# Patient Record
Sex: FEMALE | Race: BLACK OR AFRICAN AMERICAN | NOT HISPANIC OR LATINO | Employment: FULL TIME | URBAN - METROPOLITAN AREA
[De-identification: names, ages, dates, MRNs, and addresses within clinical notes are randomized per-mention and may not be internally consistent; named-entity substitution may affect disease eponyms.]

---

## 2017-01-16 ENCOUNTER — HOSPITAL ENCOUNTER (EMERGENCY)
Facility: OTHER | Age: 52
Discharge: HOME | End: 2017-01-16
Attending: EMERGENCY MEDICINE
Payer: MEDICAID

## 2017-01-16 VITALS
HEIGHT: 61 IN | WEIGHT: 152.56 LBS | SYSTOLIC BLOOD PRESSURE: 151 MMHG | OXYGEN SATURATION: 98 % | TEMPERATURE: 98 F | BODY MASS INDEX: 28.8 KG/M2 | RESPIRATION RATE: 20 BRPM | HEART RATE: 76 BPM | DIASTOLIC BLOOD PRESSURE: 90 MMHG

## 2017-01-16 DIAGNOSIS — J04.0 LARYNGITIS: Primary | ICD-10-CM

## 2017-01-16 DIAGNOSIS — J06.9 UPPER RESPIRATORY TRACT INFECTION, UNSPECIFIED TYPE: ICD-10-CM

## 2017-01-16 DIAGNOSIS — R05.9 COUGH: ICD-10-CM

## 2017-01-16 PROCEDURE — 25000242 PHARM REV CODE 250 ALT 637 W/ HCPCS: Performed by: EMERGENCY MEDICINE

## 2017-01-16 PROCEDURE — 94640 AIRWAY INHALATION TREATMENT: CPT

## 2017-01-16 PROCEDURE — 99284 EMERGENCY DEPT VISIT MOD MDM: CPT

## 2017-01-16 PROCEDURE — 99900035 HC TECH TIME PER 15 MIN (STAT)

## 2017-01-16 RX ORDER — BENZONATATE 100 MG/1
100 CAPSULE ORAL 3 TIMES DAILY PRN
Qty: 20 CAPSULE | Refills: 0 | Status: SHIPPED | OUTPATIENT
Start: 2017-01-16 | End: 2017-01-26

## 2017-01-16 RX ORDER — ALBUTEROL SULFATE 90 UG/1
1-2 AEROSOL, METERED RESPIRATORY (INHALATION) EVERY 6 HOURS PRN
Qty: 1 INHALER | Refills: 0 | Status: SHIPPED | OUTPATIENT
Start: 2017-01-16 | End: 2017-11-27

## 2017-01-16 RX ORDER — IPRATROPIUM BROMIDE AND ALBUTEROL SULFATE 2.5; .5 MG/3ML; MG/3ML
3 SOLUTION RESPIRATORY (INHALATION)
Status: COMPLETED | OUTPATIENT
Start: 2017-01-16 | End: 2017-01-16

## 2017-01-16 RX ADMIN — IPRATROPIUM BROMIDE AND ALBUTEROL SULFATE 3 ML: .5; 3 SOLUTION RESPIRATORY (INHALATION) at 05:01

## 2017-01-16 NOTE — ED NOTES
Upon discharging another pt. Ms. Brambila found to be audibly wheezing from 4 feet, Dr. Adams and Dr. Oliver notified, pt placed in room 15

## 2017-01-16 NOTE — ED NOTES
Pt c/o SOB w/ productive cough of clear sputum x 3 days. Pt denies headache, sore throat, fever, chills and N/V/D. Pt is A & O x 3, no use of accessory muscles and no nasal flaring noted. Skin is warm and dry w/ pink mucosa.

## 2017-01-16 NOTE — ED AVS SNAPSHOT
[facility]  [address]  [facility] [address]               Zoe Kosta   2017  4:23 AM   ED    Description:  Female : 1965   Department:  [facility]           Your Care was Coordinated By:     Provider Role From To    Ck Oliver MD Attending Provider 17 0424 --      Reason for Visit     Shortness of Breath           Diagnoses this Visit        Comments    Laryngitis    -  Primary     Cough         Upper respiratory tract infection, unspecified type           ED Disposition     None           To Do List           Follow-up Information     Follow up with [facility]. Schedule an appointment as soon as possible for a visit in 3 days.    Contact information:    [address]  [place] [address]  [phone]          Follow up with [facility].    Specialty:  Emergency Medicine    Why:  If symptoms worsen    Contact information:    [address]  [facility] [address] [phone]       These Medications        Disp Refills Start End    benzonatate (TESSALON) 100 MG capsule 20 capsule 0 2017    Take 1 capsule (100 mg total) by mouth 3 (three) times daily as needed for Cough. - Oral    albuterol 90 mcg/actuation inhaler 1 Inhaler 0 2017    Inhale 1-2 puffs into the lungs every 6 (six) hours as needed for Wheezing. - Inhalation      [facility] On Call     [facility] -  Assistance  Registered nurses in the [facility] provide clinical advisement, health education, appointment booking, and other advisory services.  Call for this free service at [phone].             Medications           Message regarding Medications     Verify the changes and/or additions to your medication regime listed below are the same as discussed with your clinician today.  If any of these changes or additions are incorrect, please  "notify your healthcare provider.        START taking these NEW medications        Refills    benzonatate (TESSALON) 100 MG capsule 0    Sig: Take 1 capsule (100 mg total) by mouth 3 (three) times daily as needed for Cough.    Class: Print    Route: Oral    albuterol 90 mcg/actuation inhaler 0    Sig: Inhale 1-2 puffs into the lungs every 6 (six) hours as needed for Wheezing.    Class: Print    Route: Inhalation      These medications were administered today        Dose Freq    albuterol-ipratropium 2.5mg-0.5mg/3mL nebulizer solution 3 mL 3 mL ED 1 Time    Sig: Take 3 mLs by nebulization ED 1 Time.    Class: Normal    Route: Nebulization      STOP taking these medications     ibuprofen (ADVIL,MOTRIN) 600 MG tablet Take 1 tablet (600 mg total) by mouth every 6 (six) hours as needed for Pain.           Verify that the below list of medications is an accurate representation of the medications you are currently taking.  If none reported, the list may be blank. If incorrect, please contact your healthcare provider. Carry this list with you in case of emergency.           Current Medications     albuterol 90 mcg/actuation inhaler Inhale 1-2 puffs into the lungs every 6 (six) hours as needed for Wheezing.    benzonatate (TESSALON) 100 MG capsule Take 1 capsule (100 mg total) by mouth 3 (three) times daily as needed for Cough.           Clinical Reference Information           Your Vitals Were     BP Pulse Temp Resp Height Weight    151/90 (BP Location: Left arm, Patient Position: Sitting) 76 97.7 °F (36.5 °C) (Oral) 20 5' 1" (1.549 m) 69.2 kg (152 lb 8.9 oz)    SpO2 BMI             98% 28.83 kg/m2         Allergies as of 1/16/2017     No Known Allergies      Immunizations Administered on Date of Encounter - 1/16/2017     None      ED Micro, Lab, POCT     None      ED Imaging Orders     Start Ordered       Status Ordering Provider    01/16/17 0426 01/16/17 0425  X-Ray Chest PA And Lateral  1 time imaging      Final result  "      Discharge References/Attachments     LARYNGITIS (ENGLISH)    URI, VIRAL, NO ABX (ADULT) (ENGLISH)    BENZONATATE ORAL CAPSULE (ENGLISH)    ALBUTEROL SULFATE ORAL TABLET (ENGLISH)      [facility] Sign-Up     Activating your [facility] account is as easy as 1-2-3!     1) Visit [email or web address], select Sign Up Now, enter this activation code and your date of birth, then select Next.  [email or web address]  Expires: 3/2/2017  5:40 AM      2) Create a username and password to use when you visit [facility] in the future and select a security question in case you lose your password and select Next.    3) Enter your e-mail address and click Sign Up!    Additional Information  If you have questions, please e-mail [email or web address] or call [phone] to talk to our [facility] staff. Remember, [facility] is NOT to be used for urgent needs. For medical emergencies, dial 911.         Smoking Cessation     If you would like to quit smoking:   You may be eligible for free services if you are a [place] resident and started smoking cigarettes before September 1, 1988.  Call the Smoking Cessation [employer] ([facility]) toll free at (917) 776-0667 or (035) 534-0747.   Call 6-311-QUIT-NOW if you do not meet the above criteria.             [facility] complies with applicable Federal civil rights laws and does not discriminate on the basis of race, color, national origin, age, disability, or sex.        Language Assistance Services     ATTENTION: Language assistance services are available, free of charge. Please call [phone].      ATENCIÓN: Si habla español, tiene a miller disposición servicios gratuitos de asistencia lingüística. Llame al [phone].     CHÚ Ý: N?u b?n nói Ti?ng Vi?t, có các d?ch v? h? tr? ngôn ng? mi?n phí dành cho b?n. G?i s? [phone].

## 2017-01-16 NOTE — ED PROVIDER NOTES
"Encounter Date: 2017    SCRIBE #1 NOTE: I, Gunjan Ramirez, am scribing for, and in the presence of, Dr. Oliver.       History     Chief Complaint   Patient presents with    Shortness of Breath     x 3 days, denies sore throat, headaches, N/V/D, fever, chills, productive cough of clear sputum     Review of patient's allergies indicates:  No Known Allergies  HPI Comments: Time seen by provider: 4:31 AM    This is a 51 y.o. female with myasthenia gravis who presents with complaint of severe SOB that has persisted for the past 3 days.  The patient reports associated wheezing and cold-like symptoms, including cough and  "gagging."  She also states that she lost her voice.  She reports no sore throat, HA, N/V/D, fever, or chills.  She reports no identifying, alleviating, or exacerbating factors.  The patient admits to smoking cigarettes.   The history is provided by the patient.     Past Medical History   Diagnosis Date    Myasthenia gravis      No past medical history pertinent negatives.  Past Surgical History   Procedure Laterality Date     section       History reviewed. No pertinent family history.  Social History   Substance Use Topics    Smoking status: Current Every Day Smoker    Smokeless tobacco: None    Alcohol use Yes     Review of Systems   Constitutional: Negative for chills and fever.   HENT: Positive for voice change. Negative for facial swelling and sore throat.    Respiratory: Positive for cough, shortness of breath and wheezing.    Cardiovascular: Negative for chest pain.   Gastrointestinal: Negative for abdominal pain, diarrhea, nausea and vomiting.   Endocrine: Negative for polyuria.   Genitourinary: Negative for dysuria.   Musculoskeletal: Negative for myalgias.   Skin: Negative for rash.   Neurological: Negative for headaches.       Physical Exam   Initial Vitals   BP Pulse Resp Temp SpO2   17 0422 17 0422 17 0422 17 0422 17 042   151/90 79 20 97.7 °F " (36.5 °C) 98 %     Physical Exam    Nursing note and vitals reviewed.  Constitutional: She appears well-developed and well-nourished. She is not diaphoretic. No distress.   HENT:   Head: Normocephalic and atraumatic.   Right Ear: External ear normal.   Left Ear: External ear normal.   Mouth/Throat: Oropharynx is clear and moist. No uvula swelling.   No tonsillar exudate, edema, or erythema.  No uvular swelling. Bilateral tympanic membranes without effusion.     Eyes: EOM are normal. Right eye exhibits no discharge. Left eye exhibits no discharge.   Neck: Normal range of motion.   Cardiovascular: Normal rate, regular rhythm and normal heart sounds. Exam reveals no gallop and no friction rub.    No murmur heard.  Pulmonary/Chest: No respiratory distress. She has no wheezes. She has no rhonchi. She has no rales.   Lungs cear to ascultation bilaterally.  Scratchy upper airway noises.   Abdominal: Soft. There is no tenderness. There is no rebound and no guarding.   Musculoskeletal: Normal range of motion. She exhibits no edema or tenderness.   Lymphadenopathy:     She has no cervical adenopathy.   Neurological: She is alert and oriented to person, place, and time.   Skin: Skin is warm and dry. No rash and no abscess noted. No erythema. No pallor.   Psychiatric: She has a normal mood and affect. Her behavior is normal. Judgment and thought content normal.         ED Course   Procedures  Labs Reviewed - No data to display   Imaging Results         X-Ray Chest PA And Lateral (Final result) Result time:  01/16/17 04:51:55    Final result by Alex Lacy MD (01/16/17 04:51:55)    Impression:         No acute cardiopulmonary process.    Micronodule projected over the lateral aspect of the left lower lung zone, not convincingly present on previous radiographs or previous CT.  Given its size, consider comparison with any more recent examinations versus short-term followup.         Electronically signed by: ALEX LACY  MD  Date:     01/16/17  Time:    04:51     Narrative:    Chest PA and lateral    Indication:Cough    Comparison:1/13/2016    Findings:  The cardiomediastinal silhouette is not enlarged.  There is no pleural effusion.  The trachea is midline.  The lungs are symmetrically expanded bilaterally without evidence of acute parenchymal process.  There may be a granuloma projected over lateral aspect of the left lower lung zone, differential would include punctate pulmonary nodule or focus of the overlying breast.  Of note, this was not confidently present on the previous chest radiograph, or on CT 7/13/2016.  No large focal consolidation seen.  There is no pneumothorax.  The osseous structures remarkable for degenerative changes of the spine and shoulders.                   X-Rays:   Independently Interpreted Readings:   Chest X-Ray: X-Ray Chest PA And Lateral: No effusions or opacities. No obvious infiltrate. No bony abnormalities. No acute process.      Medical Decision Making:   Clinical Tests:   Radiological Study: Ordered and Reviewed  ED Management:  Patient presents with history and physical most consistent with upper respiratory type infection.  Does have obvious laryngitis, scratchy voice.  No airway swelling.  No tonsillar edema or erythema.  X-ray clear.  Improves with Tessalon Perles and albuterol.  Counseled to quit smoking.  Follow-up primary care    JOHANN Oliver M.D.  01/16/2017  5:51 AM              Scribe Attestation:   Scribe #1: I performed the above scribed service and the documentation accurately describes the services I performed. I attest to the accuracy of the note.    Attending Attestation:           Physician Attestation for Scribe:  Physician Attestation Statement for Scribe #1: I, Dr. Oliver, reviewed documentation, as scribed by Gunjan Ramirez in my presence, and it is both accurate and complete.                 ED Course     Clinical Impression:     1. Laryngitis    2. Cough    3. Upper  respiratory tract infection, unspecified type               Ck Oliver MD  01/16/17 8998

## 2017-01-16 NOTE — DISCHARGE INSTRUCTIONS
"Follow up with PCP. You need a repeat CXR to evaluate the following finding on your CXR today: "Micronodule projected over the lateral aspect of the left lower lung zone, not convincingly present on previous radiographs or previous CT.  Given its size, consider comparison with any more recent examinations versus short-term followup. "  "

## 2017-01-16 NOTE — ED NOTES
Upon entering room for d/c, pt asleep with no adventitious breath sounds audible. Pt sat up and began having upper airway/throat noises intermittently

## 2017-11-27 ENCOUNTER — HOSPITAL ENCOUNTER (EMERGENCY)
Facility: OTHER | Age: 52
Discharge: HOME | End: 2017-11-27
Attending: EMERGENCY MEDICINE
Payer: MEDICAID

## 2017-11-27 VITALS
WEIGHT: 169 LBS | SYSTOLIC BLOOD PRESSURE: 188 MMHG | HEART RATE: 72 BPM | HEIGHT: 61 IN | OXYGEN SATURATION: 99 % | TEMPERATURE: 98 F | BODY MASS INDEX: 31.91 KG/M2 | RESPIRATION RATE: 16 BRPM | DIASTOLIC BLOOD PRESSURE: 96 MMHG

## 2017-11-27 DIAGNOSIS — L30.9 DERMATITIS: Primary | ICD-10-CM

## 2017-11-27 PROCEDURE — 99283 EMERGENCY DEPT VISIT LOW MDM: CPT

## 2017-11-27 RX ORDER — METHYLPREDNISOLONE 4 MG/1
TABLET ORAL
Qty: 1 PACKAGE | Refills: 0 | Status: SHIPPED | OUTPATIENT
Start: 2017-11-27 | End: 2017-12-18

## 2017-11-27 RX ORDER — DIPHENHYDRAMINE HCL 25 MG
25 CAPSULE ORAL EVERY 6 HOURS PRN
Qty: 20 CAPSULE | Refills: 0 | Status: SHIPPED | OUTPATIENT
Start: 2017-11-27 | End: 2018-01-30

## 2017-11-28 NOTE — ED NOTES
Pt states that she has a rash all over her neck and it is spreading onto her face. It itches really bad. She states that she had it around father's day and used calamine lotion and it went away but now it is back and getting worse

## 2017-11-28 NOTE — ED PROVIDER NOTES
Encounter Date: 2017       History     Chief Complaint   Patient presents with    Rash     pt c/o itching rash to the chest x1week not relieved with benadryl     Patient is a 52-year-old female with history of myasthenia gravis who presents to the emergency department with a rash.  Patient states over the last 3 days she has noticed a rash to her chest.  She states the area is very itchy.  She reports red bumps.  She denies any new lotions, soaps, laundry detergents, or other body products.  She denies any changes in medications.  She denies any shortness of breath or oral cavity swelling.  She denies nausea or vomiting.  She denies recent illness.      The history is provided by the patient.     Review of patient's allergies indicates:  No Known Allergies  Past Medical History:   Diagnosis Date    Myasthenia gravis      Past Surgical History:   Procedure Laterality Date     SECTION      vocal chord surgery       No family history on file.  Social History   Substance Use Topics    Smoking status: Current Every Day Smoker    Smokeless tobacco: Never Used    Alcohol use Yes     Review of Systems   Constitutional: Negative for activity change, appetite change, chills, fatigue and fever.   HENT: Negative for congestion, ear discharge, ear pain, nosebleeds, postnasal drip, rhinorrhea, sore throat and trouble swallowing.    Respiratory: Negative for cough and shortness of breath.    Cardiovascular: Negative for chest pain.   Gastrointestinal: Negative for abdominal pain, blood in stool, constipation, diarrhea, nausea and vomiting.   Genitourinary: Negative for dysuria, flank pain and hematuria.   Musculoskeletal: Negative for back pain, neck pain and neck stiffness.   Skin: Positive for rash. Negative for wound.   Neurological: Negative for dizziness, syncope, speech difficulty, weakness, light-headedness, numbness and headaches.       Physical Exam     Initial Vitals [17 1901]   BP Pulse Resp  Temp SpO2   (!) 169/92 76 16 97.6 °F (36.4 °C) 98 %      MAP       117.67         Physical Exam    Nursing note and vitals reviewed.  Constitutional: She appears well-developed and well-nourished. She is not diaphoretic.  Non-toxic appearance. No distress.   HENT:   Head: Normocephalic.   Right Ear: Hearing and external ear normal.   Left Ear: Hearing and external ear normal.   Nose: Nose normal.   Mouth/Throat: Uvula is midline, oropharynx is clear and moist and mucous membranes are normal. Mucous membranes are not pale. No trismus in the jaw. No uvula swelling. No oropharyngeal exudate.   Eyes: Conjunctivae are normal. Pupils are equal, round, and reactive to light.   Neck: Normal range of motion.   Cardiovascular: Normal rate and regular rhythm.   Pulmonary/Chest: Breath sounds normal. No respiratory distress. She has no wheezes. She has no rhonchi. She has no rales. She exhibits no tenderness.   Abdominal: Soft. Bowel sounds are normal. There is no tenderness.   Lymphadenopathy:     She has no cervical adenopathy.   Neurological: She is alert and oriented to person, place, and time.   Skin: Skin is dry. Capillary refill takes less than 2 seconds.   Erythematous papules on chest in v-shape   Psychiatric: She has a normal mood and affect.         ED Course   Procedures  Labs Reviewed - No data to display          Medical Decision Making:   Initial Assessment:   Urgent evaluation of a 52-year-old female with no significant medical history who presents to the emergency department with rash.  Patient is afebrile, nontoxic appearing, and hemodynamically stable.  On exam, patient has erythematous papules in a v-shape on chest.  No other acute abnormalities noted on exam.  I suspect a dermatitis.  She'll be given steroids and antihistamine.  She is advised follow-up with PCP or Derm.  She is advised to return to the emergency department with any worsening symptoms or concerns.  Other:   I have discussed this case with  another health care provider.       <> Summary of the Discussion: Dr. Oliver                   ED Course      Clinical Impression:   The encounter diagnosis was Dermatitis.                           Lisset De PA-C  11/28/17 0006

## 2018-01-30 PROBLEM — K21.9 GASTROESOPHAGEAL REFLUX DISEASE: Status: ACTIVE | Noted: 2018-01-30

## 2018-01-30 PROBLEM — Z00.00 REGULAR CHECK-UP: Status: ACTIVE | Noted: 2018-01-30

## 2018-01-30 PROBLEM — R03.0 ELEVATED BLOOD PRESSURE READING: Status: ACTIVE | Noted: 2018-01-30

## 2018-01-30 PROBLEM — L02.212 ABSCESS OF BACK: Status: ACTIVE | Noted: 2018-01-30

## 2018-01-30 PROBLEM — D22.9 ATYPICAL MOLE: Status: ACTIVE | Noted: 2018-01-30

## 2018-03-27 PROBLEM — M25.642 STIFFNESS OF FINGER JOINT OF LEFT HAND: Status: ACTIVE | Noted: 2018-03-27

## 2018-03-27 PROBLEM — R20.2 NUMBNESS AND TINGLING OF RIGHT LOWER EXTREMITY: Status: ACTIVE | Noted: 2018-03-27

## 2018-03-27 PROBLEM — I10 HYPERTENSION: Status: ACTIVE | Noted: 2018-03-27

## 2018-03-27 PROBLEM — R20.0 NUMBNESS AND TINGLING OF RIGHT LOWER EXTREMITY: Status: ACTIVE | Noted: 2018-03-27

## 2018-05-07 PROBLEM — Z00.00 REGULAR CHECK-UP: Status: RESOLVED | Noted: 2018-01-30 | Resolved: 2018-05-07

## 2018-10-25 PROBLEM — F41.9 ANXIETY: Status: ACTIVE | Noted: 2018-10-25

## 2019-02-05 ENCOUNTER — HOSPITAL ENCOUNTER (EMERGENCY)
Facility: OTHER | Age: 54
Discharge: HOME | End: 2019-02-05
Attending: EMERGENCY MEDICINE

## 2019-02-05 VITALS
SYSTOLIC BLOOD PRESSURE: 149 MMHG | TEMPERATURE: 99 F | HEIGHT: 61 IN | OXYGEN SATURATION: 98 % | RESPIRATION RATE: 20 BRPM | HEART RATE: 106 BPM | DIASTOLIC BLOOD PRESSURE: 82 MMHG | WEIGHT: 160 LBS | BODY MASS INDEX: 30.21 KG/M2

## 2019-02-05 DIAGNOSIS — J10.1 INFLUENZA A: Primary | ICD-10-CM

## 2019-02-05 DIAGNOSIS — R11.10 EMESIS: ICD-10-CM

## 2019-02-05 LAB
ALBUMIN SERPL BCP-MCNC: 3.9 G/DL
ALP SERPL-CCNC: 84 U/L
ALT SERPL W/O P-5'-P-CCNC: 21 U/L
ANION GAP SERPL CALC-SCNC: 8 MMOL/L
AST SERPL-CCNC: 16 U/L
BASOPHILS # BLD AUTO: 0.01 K/UL
BASOPHILS NFR BLD: 0.2 %
BILIRUB SERPL-MCNC: 0.3 MG/DL
BUN SERPL-MCNC: 9 MG/DL
CALCIUM SERPL-MCNC: 10 MG/DL
CHLORIDE SERPL-SCNC: 101 MMOL/L
CO2 SERPL-SCNC: 27 MMOL/L
CREAT SERPL-MCNC: 0.8 MG/DL
DIFFERENTIAL METHOD: ABNORMAL
EOSINOPHIL # BLD AUTO: 0 K/UL
EOSINOPHIL NFR BLD: 0.3 %
ERYTHROCYTE [DISTWIDTH] IN BLOOD BY AUTOMATED COUNT: 15.6 %
EST. GFR  (AFRICAN AMERICAN): >60 ML/MIN/1.73 M^2
EST. GFR  (NON AFRICAN AMERICAN): >60 ML/MIN/1.73 M^2
GLUCOSE SERPL-MCNC: 129 MG/DL
HCT VFR BLD AUTO: 32.9 %
HGB BLD-MCNC: 10.9 G/DL
INFLUENZA A, MOLECULAR: POSITIVE
INFLUENZA B, MOLECULAR: NEGATIVE
LYMPHOCYTES # BLD AUTO: 0.4 K/UL
LYMPHOCYTES NFR BLD: 7.1 %
MCH RBC QN AUTO: 24.7 PG
MCHC RBC AUTO-ENTMCNC: 33.1 G/DL
MCV RBC AUTO: 75 FL
MONOCYTES # BLD AUTO: 0.3 K/UL
MONOCYTES NFR BLD: 5.7 %
NEUTROPHILS # BLD AUTO: 5.2 K/UL
NEUTROPHILS NFR BLD: 86.5 %
PLATELET # BLD AUTO: 254 K/UL
PMV BLD AUTO: 11.3 FL
POTASSIUM SERPL-SCNC: 3.5 MMOL/L
PROT SERPL-MCNC: 8.9 G/DL
RBC # BLD AUTO: 4.41 M/UL
SODIUM SERPL-SCNC: 136 MMOL/L
SPECIMEN SOURCE: ABNORMAL
WBC # BLD AUTO: 5.95 K/UL

## 2019-02-05 PROCEDURE — 85025 COMPLETE CBC W/AUTO DIFF WBC: CPT

## 2019-02-05 PROCEDURE — 25000003 PHARM REV CODE 250: Performed by: EMERGENCY MEDICINE

## 2019-02-05 PROCEDURE — 80053 COMPREHEN METABOLIC PANEL: CPT

## 2019-02-05 PROCEDURE — 87502 INFLUENZA DNA AMP PROBE: CPT

## 2019-02-05 PROCEDURE — 96374 THER/PROPH/DIAG INJ IV PUSH: CPT

## 2019-02-05 PROCEDURE — 63600175 PHARM REV CODE 636 W HCPCS: Performed by: EMERGENCY MEDICINE

## 2019-02-05 PROCEDURE — 99284 EMERGENCY DEPT VISIT MOD MDM: CPT | Mod: 25

## 2019-02-05 PROCEDURE — 96375 TX/PRO/DX INJ NEW DRUG ADDON: CPT

## 2019-02-05 RX ORDER — ONDANSETRON 4 MG/1
4 TABLET, ORALLY DISINTEGRATING ORAL EVERY 8 HOURS PRN
Qty: 30 TABLET | Refills: 0 | OUTPATIENT
Start: 2019-02-05 | End: 2020-06-12

## 2019-02-05 RX ORDER — SODIUM CHLORIDE 9 MG/ML
1000 INJECTION, SOLUTION INTRAVENOUS
Status: COMPLETED | OUTPATIENT
Start: 2019-02-05 | End: 2019-02-05

## 2019-02-05 RX ORDER — OSELTAMIVIR PHOSPHATE 75 MG/1
75 CAPSULE ORAL 2 TIMES DAILY
Qty: 10 CAPSULE | Refills: 0 | Status: SHIPPED | OUTPATIENT
Start: 2019-02-05 | End: 2019-02-10

## 2019-02-05 RX ORDER — BENZONATATE 100 MG/1
100 CAPSULE ORAL 3 TIMES DAILY PRN
Qty: 20 CAPSULE | Refills: 0 | Status: SHIPPED | OUTPATIENT
Start: 2019-02-05 | End: 2019-02-15

## 2019-02-05 RX ORDER — ONDANSETRON 2 MG/ML
4 INJECTION INTRAMUSCULAR; INTRAVENOUS
Status: COMPLETED | OUTPATIENT
Start: 2019-02-05 | End: 2019-02-05

## 2019-02-05 RX ORDER — NAPROXEN 375 MG/1
375 TABLET ORAL 2 TIMES DAILY WITH MEALS
Qty: 60 TABLET | Refills: 0 | OUTPATIENT
Start: 2019-02-05 | End: 2020-06-12

## 2019-02-05 RX ORDER — KETOROLAC TROMETHAMINE 30 MG/ML
15 INJECTION, SOLUTION INTRAMUSCULAR; INTRAVENOUS
Status: COMPLETED | OUTPATIENT
Start: 2019-02-05 | End: 2019-02-05

## 2019-02-05 RX ORDER — KETOROLAC TROMETHAMINE 30 MG/ML
15 INJECTION, SOLUTION INTRAMUSCULAR; INTRAVENOUS
Status: DISCONTINUED | OUTPATIENT
Start: 2019-02-05 | End: 2019-02-05

## 2019-02-05 RX ADMIN — SODIUM CHLORIDE 1000 ML: 0.9 INJECTION, SOLUTION INTRAVENOUS at 12:02

## 2019-02-05 RX ADMIN — KETOROLAC TROMETHAMINE 15 MG: 30 INJECTION, SOLUTION INTRAMUSCULAR; INTRAVENOUS at 01:02

## 2019-02-05 RX ADMIN — ONDANSETRON 4 MG: 2 INJECTION INTRAMUSCULAR; INTRAVENOUS at 01:02

## 2019-02-05 NOTE — ED NOTES
Patient Identifiers for Zoe Brambila checked and correct  + cough + productive sputum  LOC: The patient is awake, alert and aware of environment with an appropriate affect, the patient is oriented x 3 and speaking appropriate.  APPEARANCE: Patient resting comfortably and in no acute distress, patient is clean and well groomed, patient's clothing is properly fastened.  SKIN: The skin is warm and dry, patient has normal skin turgor and moist mucus membranes,no rashes or lesions.Skin Intact , No Breakdown Noted  Musculoskeletal :  Normal range of motion noted. Moves all extremeties well, No swelling or tenderness noted  RESPIRATORY: Airway is open and patent, respirations are spontaneous, patient has a normal effort and rate.Bilateral Lungs Sounds are slightly coarse  CARDIAC: Patient has a normal rate and rhythm, no periphreal edema noted, capillary refill < 3 seconds.   ABDOMEN: Soft and non tender to palpation, no distention noted.   PULSES: 2+  And symmetrical in all extremeties  NEUROLOGIC: PERRL, facial expression is symmetrical, patient moving all extremities, normal sensation in all extremities when touched with a finger.The patient is awake, alert and cooperative with a calm affect, patient is aware of environment.    Will continue to monitor

## 2019-02-05 NOTE — ED PROVIDER NOTES
Encounter Date: 2019    SCRIBE #1 NOTE: I, Fatoumata Valerio, am scribing for, and in the presence of, Dr. Acosta.       History     Chief Complaint   Patient presents with    Emesis     Pt c/o generalized aches, H/A, productive cough & subjective fever with hot flashes & chills which started yesterday. Pt also c/o weakness & N/V, unable to hold down water.     Time seen by provider: 12:26 PM    This is a 53 y.o. female with hx of myasthenia gravis who presents with complaint of cough starting yesterday morning. She states cough was initially dry, but became productive over time. She reports associated subjective fever, chills, decreased appetite, rhinorrhea, N/V, and myalgias. She reports two to three episodes of emesis. She states that her sister also feels sick with a sore throat. Pt denies any ear pain, sore throat, diarrhea, abdominal pain, chest pain, SOB, HA, weakness, and urinary sx. She did not receive a flu shot this season.      The history is provided by the patient.     Review of patient's allergies indicates:  No Known Allergies  Past Medical History:   Diagnosis Date    Myasthenia gravis      Past Surgical History:   Procedure Laterality Date     SECTION      vocal chord surgery       No family history on file.  Social History     Tobacco Use    Smoking status: Current Every Day Smoker    Smokeless tobacco: Never Used   Substance Use Topics    Alcohol use: Yes    Drug use: Yes     Types: Marijuana     Review of Systems   Constitutional: Positive for appetite change, chills and fever (subjective). Negative for diaphoresis.   HENT: Positive for rhinorrhea. Negative for congestion, ear pain and sore throat.    Eyes: Negative for visual disturbance.   Respiratory: Positive for cough. Negative for shortness of breath.    Cardiovascular: Negative for chest pain.   Gastrointestinal: Positive for nausea and vomiting. Negative for abdominal pain and diarrhea.   Endocrine: Negative for  polyuria.   Genitourinary: Negative for decreased urine volume and dysuria.   Musculoskeletal: Positive for myalgias. Negative for back pain.   Skin: Negative for rash.   Allergic/Immunologic: Negative for immunocompromised state.   Neurological: Negative for dizziness, weakness, light-headedness and headaches.   Hematological: Does not bruise/bleed easily.   Psychiatric/Behavioral: Negative for confusion.       Physical Exam     Initial Vitals [02/05/19 1140]   BP Pulse Resp Temp SpO2   (!) 159/90 (!) 116 20 99.7 °F (37.6 °C) 97 %      MAP       --         Physical Exam    Nursing note and vitals reviewed.  Constitutional: She appears well-developed and well-nourished. No distress.   HENT:   Head: Normocephalic and atraumatic.   Right Ear: External ear normal.   Left Ear: External ear normal.   Dry mucosa. Hoarse voice.   Eyes: Right eye exhibits no discharge. Left eye exhibits no discharge.   Neck: Normal range of motion. Neck supple.   Cardiovascular: Regular rhythm and normal heart sounds.   Tachycardic at 110 bpm.   Pulmonary/Chest: Breath sounds normal. No respiratory distress.   Abdominal: Soft. Bowel sounds are normal. She exhibits no distension. There is no tenderness.   Musculoskeletal: Normal range of motion.   Lymphadenopathy:     She has no cervical adenopathy.   Neurological: She is alert and oriented to person, place, and time. She has normal strength. No cranial nerve deficit or sensory deficit.   Skin: Skin is warm and dry. No rash noted. No erythema.   Psychiatric: She has a normal mood and affect. Her behavior is normal.         ED Course   Procedures  Labs Reviewed   INFLUENZA A & B BY MOLECULAR - Abnormal; Notable for the following components:       Result Value    Influenza A, Molecular Positive (*)     All other components within normal limits   CBC W/ AUTO DIFFERENTIAL - Abnormal; Notable for the following components:    Hemoglobin 10.9 (*)     Hematocrit 32.9 (*)     MCV 75 (*)     MCH 24.7  (*)     RDW 15.6 (*)     Lymph # 0.4 (*)     Gran% 86.5 (*)     Lymph% 7.1 (*)     All other components within normal limits   COMPREHENSIVE METABOLIC PANEL - Abnormal; Notable for the following components:    Glucose 129 (*)     Total Protein 8.9 (*)     All other components within normal limits          Imaging Results          X-Ray Chest PA And Lateral (Final result)  Result time 02/05/19 12:24:55    Final result by Edgar Lacy MD (02/05/19 12:24:55)                 Impression:      1. Stable chronic findings, no acute cardiopulmonary process.      Electronically signed by: Edgar Lacy MD  Date:    02/05/2019  Time:    12:24             Narrative:    EXAMINATION:  XR CHEST PA AND LATERAL    CLINICAL HISTORY:  Vomiting, unspecified    TECHNIQUE:  PA and lateral views of the chest were performed.    COMPARISON:  01/16/2017    FINDINGS:  The cardiomediastinal silhouette is not enlarged.  There is no pleural effusion.  The trachea is midline.  The lungs are symmetrically expanded bilaterally with coarse interstitial attenuation.  No large focal consolidation seen.  There is no pneumothorax.  The osseous structures are remarkable for degenerative change..  There is a stable punctate focus of increased attenuation projected over the left lower lung zone laterally, may reflect granuloma.  Follow-up as warranted.                              X-Rays:   Independently Interpreted Readings:   Chest X-Ray: Normal heart size.  No infiltrates.  No acute abnormalities.     Medical Decision Making:   Initial Assessment:   53 y.o. female presents with cough, vomiting, congestion, malaise, and body aches since yesterday concerning for influenza. Will plan supportive care, flu swab, and antiemetics.   Differential Diagnosis:   Viral upper respiratory infection, influenza, sinusitis, allergic rhinitis, bronchitis, influenza, pneumonia, dental infection, pharyngitis   Independently Interpreted Test(s):   I have ordered and  independently interpreted X-rays - see prior notes.  Clinical Tests:   Lab Tests: Ordered and Reviewed  Radiological Study: Ordered and Reviewed  ED Management:  1:18 PM - Patient reevaluated, concerns addressed and updated on status of testing and evaluation.     Patient improved with treatment in the emergency department and comfortable going home. Discussed reasons to return and importance of followup.  Patient understands that the emergency visit today is primarily to address immediate concerns and to rule out emergent cause of symptoms and that they may require further workup and evaluation as an outpatient. All questions addressed and patient given discharge instructions and followup information.               Scribe Attestation:   Scribe #1: I performed the above scribed service and the documentation accurately describes the services I performed. I attest to the accuracy of the note.    Attending Attestation:           Physician Attestation for Scribe:  Physician Attestation Statement for Scribe #1: I, Dr. Acosta, reviewed documentation, as scribed by Fatoumata Valerio in my presence, and it is both accurate and complete.                    Clinical Impression:     1. Influenza A    2. Emesis                                 Ni Acosta MD  02/06/19 1007

## 2020-06-12 ENCOUNTER — HOSPITAL ENCOUNTER (EMERGENCY)
Facility: OTHER | Age: 55
Discharge: HOME | End: 2020-06-12
Attending: EMERGENCY MEDICINE
Payer: MEDICAID

## 2020-06-12 VITALS
DIASTOLIC BLOOD PRESSURE: 72 MMHG | RESPIRATION RATE: 20 BRPM | WEIGHT: 161.63 LBS | BODY MASS INDEX: 30.51 KG/M2 | OXYGEN SATURATION: 98 % | SYSTOLIC BLOOD PRESSURE: 133 MMHG | HEIGHT: 61 IN | TEMPERATURE: 98 F | HEART RATE: 77 BPM

## 2020-06-12 DIAGNOSIS — G43.809 OTHER MIGRAINE WITHOUT STATUS MIGRAINOSUS, NOT INTRACTABLE: Primary | ICD-10-CM

## 2020-06-12 PROCEDURE — 63600175 PHARM REV CODE 636 W HCPCS: Performed by: EMERGENCY MEDICINE

## 2020-06-12 PROCEDURE — 25000003 PHARM REV CODE 250: Performed by: EMERGENCY MEDICINE

## 2020-06-12 PROCEDURE — 99285 EMERGENCY DEPT VISIT HI MDM: CPT | Mod: 25

## 2020-06-12 PROCEDURE — 96372 THER/PROPH/DIAG INJ SC/IM: CPT

## 2020-06-12 RX ORDER — ONDANSETRON 4 MG/1
4 TABLET, ORALLY DISINTEGRATING ORAL EVERY 6 HOURS PRN
Qty: 20 TABLET | Refills: 0 | Status: SHIPPED | OUTPATIENT
Start: 2020-06-12

## 2020-06-12 RX ORDER — BUTALBITAL, ACETAMINOPHEN AND CAFFEINE 50; 325; 40 MG/1; MG/1; MG/1
1 TABLET ORAL
Status: COMPLETED | OUTPATIENT
Start: 2020-06-12 | End: 2020-06-12

## 2020-06-12 RX ORDER — KETOROLAC TROMETHAMINE 30 MG/ML
30 INJECTION, SOLUTION INTRAMUSCULAR; INTRAVENOUS
Status: COMPLETED | OUTPATIENT
Start: 2020-06-12 | End: 2020-06-12

## 2020-06-12 RX ORDER — BUTALBITAL, ACETAMINOPHEN AND CAFFEINE 50; 325; 40 MG/1; MG/1; MG/1
1 TABLET ORAL EVERY 4 HOURS PRN
Qty: 13 TABLET | Refills: 0 | Status: SHIPPED | OUTPATIENT
Start: 2020-06-12 | End: 2020-07-12

## 2020-06-12 RX ADMIN — BUTALBITAL, ACETAMINOPHEN, AND CAFFEINE 1 TABLET: 50; 325; 40 TABLET ORAL at 04:06

## 2020-06-12 RX ADMIN — KETOROLAC TROMETHAMINE 30 MG: 30 INJECTION, SOLUTION INTRAMUSCULAR at 04:06

## 2020-06-12 NOTE — ED NOTES
Pt presents to ed with c/o head pain, specifically located behind L eye since yesterday. Pt reports one episode of vomiting PTA. Denies nausea at present. Patient has no other c/o. Monitor in place. Will continue to monitor.

## 2020-06-12 NOTE — ED NOTES
Patient states that she is feeling better. Pt waiting for dc papers. md informed. Will continue to monitor.

## 2020-06-12 NOTE — ED PROVIDER NOTES
Encounter Date: 2020    SCRIBE #1 NOTE: I, Betty Bal, am scribing for, and in the presence of, Dr. Diaz.       History     Chief Complaint   Patient presents with    Headache     +Frontal H/A since last PM, starting approx. 2100. Reports gradually worsening pain. No neuro deficits noted.      Time seen by provider: 4:40 PM    This is a 55 y.o. female who presents with complaint of headache that started yesterday. She reports the pain is sharp and over her left eye, 81/0, throbbing and non-radiating. She reports mild nausea. She denies fever or weakness in arms or legs. Pt denies any allergies.  Patient states this does feel different from previous headaches she has had from sinus issues in the past.    The history is provided by the patient.     Review of patient's allergies indicates:  No Known Allergies  Past Medical History:   Diagnosis Date    Hyperlipidemia     Hypertension     Myasthenia gravis      Past Surgical History:   Procedure Laterality Date     SECTION      vocal chord surgery       No family history on file.  Social History     Tobacco Use    Smoking status: Current Every Day Smoker    Smokeless tobacco: Never Used   Substance Use Topics    Alcohol use: Yes    Drug use: Yes     Types: Marijuana     Review of Systems   Constitutional: Negative for chills and fever.   HENT: Negative for congestion.    Respiratory: Negative for cough and shortness of breath.    Cardiovascular: Negative for chest pain.   Gastrointestinal: Positive for nausea. Negative for constipation and vomiting.   Genitourinary: Negative for dysuria and flank pain.   Musculoskeletal: Negative for back pain.   Skin: Negative for rash.   Neurological: Positive for headaches. Negative for weakness.   Psychiatric/Behavioral: Negative for confusion.   All other systems reviewed and are negative.      Physical Exam     Initial Vitals [20 1538]   BP Pulse Resp Temp SpO2   (!) 161/100 78 18 97 °F (36.1 °C)  99 %      MAP       --         Physical Exam    Nursing note and vitals reviewed.  Constitutional: She appears well-developed and well-nourished. She is not diaphoretic. No distress.   HENT:   Head: Normocephalic and atraumatic.   Right Ear: External ear normal.   Left Ear: External ear normal.   Nose: Nose normal.   Mouth/Throat: Oropharynx is clear and moist.   No maxillary or frontal sinus tenderness palpation  Bilateral TMs nonbulging, no erythema   Eyes: Conjunctivae and EOM are normal. Pupils are equal, round, and reactive to light. No scleral icterus.   Neck: Normal range of motion. Neck supple. No tracheal deviation present. No JVD present.   Cardiovascular: Normal rate, regular rhythm, normal heart sounds and intact distal pulses. Exam reveals no friction rub.    No murmur heard.  Pulmonary/Chest: Breath sounds normal. No respiratory distress. She has no wheezes. She has no rhonchi. She has no rales. She exhibits no tenderness.   Abdominal: Soft. Bowel sounds are normal. She exhibits no distension and no mass. There is no tenderness. There is no rebound and no guarding.   Musculoskeletal: Normal range of motion. She exhibits no edema or tenderness.   Neurological: She is alert and oriented to person, place, and time. She has normal strength and normal reflexes. She displays normal reflexes. No cranial nerve deficit or sensory deficit. GCS score is 15. GCS eye subscore is 4. GCS verbal subscore is 5. GCS motor subscore is 6.   Skin: Skin is warm and dry. Capillary refill takes less than 2 seconds. No rash noted. No erythema.   Psychiatric: She has a normal mood and affect. Thought content normal.         ED Course   Procedures  Labs Reviewed - No data to display       Imaging Results          CT Head Without Contrast (Final result)  Result time 06/12/20 16:15:50    Final result by Joel Martinez DO (06/12/20 16:15:50)                 Impression:      Unremarkable noncontrast CT head specifically without  evidence for acute intracranial hemorrhage.  Clinical correlation and further evaluation as warranted.      Electronically signed by: Joel Martinez DO  Date:    06/12/2020  Time:    16:15             Narrative:    EXAMINATION:  CT HEAD WITHOUT CONTRAST    CLINICAL HISTORY:  Headache;    TECHNIQUE:  Multiple sequential 5 mm axial images of the head without contrast.  Coronal and sagittal reformatted imaging from the axial acquisition.    COMPARISON:  None    FINDINGS:  There is no evidence for acute intracranial hemorrhage or sulcal effacement.  The ventricles are normal in size without hydrocephalus.  Incidental punctate calcification choroid plexus roof of the 3rd ventricle.  There is no midline shift or mass effect.  Visualized paranasal sinuses and mastoid air cells are clear.                                 Medical Decision Making:   History:   Old Medical Records: I decided to obtain old medical records.  Differential Diagnosis:   Migraine, tension headache, sinus headache, cluster headache, trigeminal neuralgia, ICH, TIA, seizure, meningitis, cerebral venous sinus thrombosis, spontaneous cerebrospinal fluid leak, Idiopathic intracranial hypertension, Giant cell arteritis, Arteriovenous malformation, Brain tumor, Chronic meningitis, Chronic subdural hematoma, Dissection of carotid or vertebral artery, Dural arteriovenous fistula, Leptomeningeal metastasis, Post-meningitis headache, Post-traumatic headache, Sphenoid sinusitis, Subarachnoid hemorrhage    Clinical Tests:   Radiological Study: Ordered and Reviewed  ED Management:  55-year-old female with onset of the headache different from headaches she has had in past given her age her feel that she warrants imaging.   After taking into careful account the patient's historical factors, physical exam findings, empirical and objective data obtained from ED workup, the patient appears to be low risk for an emergent medical condition. I feel it is safe and  appropriate at this time for the patient to be discharged for follow up and re-evaluation as detailed in the discharge instructions. The patient improved with treatment in the ED and the patient/guardian is comfortable going home. I have discussed the specifics of the workup with the patient/guardian and the patient/guardian has verbalized understanding of the details of the workup, the diagnosis, the treatment plan, and the need for outpatient follow-up.  Although the patient has no emergent etiology today this does not preclude the development of an emergent condition after discharge.  I educated the patient/guardian on the warning signs and symptoms for which they must seek immediate medical attention. I have advised the patient/guardian that they can return to the ED and/or activate EMS at any time with worsening of their symptoms, change of their symptoms, or with any other medical complaints.  Patient's/guardian understands the ED visit today was primarily to address immediate concerns and to rule out emergent causes of the symptoms. They also understand that these symptoms may require further workup and evaluation as an outpatient. I emphasized the importance of followup.  All questions addressed and patient/guardian were given discharge instructions and followup information.               Scribe Attestation:   Scribe #1: I performed the above scribed service and the documentation accurately describes the services I performed. I attest to the accuracy of the note.    Attending Attestation:           Physician Attestation for Scribe:  Physician Attestation Statement for Scribe #1: I, Dr. Diaz, reviewed documentation, as scribed by Betty Bal in my presence, and it is both accurate and complete.                 ED Course as of Jun 12 1848 Fri Jun 12, 2020 1746 Patient's headache has resolved.     [MA]      ED Course User Index  [MA] Jaciel Diaz MD                Clinical Impression:     1. Other  migraine without status migrainosus, not intractable                ED Disposition Condition    Discharge Stable        ED Prescriptions     Medication Sig Dispense Start Date End Date Auth. Provider    ondansetron (ZOFRAN-ODT) 4 MG TbDL Take 1 tablet (4 mg total) by mouth every 6 (six) hours as needed. 20 tablet 6/12/2020  Jaciel Diaz MD    butalbital-acetaminophen-caffeine -40 mg (FIORICET, ESGIC) -40 mg per tablet Take 1 tablet by mouth every 4 (four) hours as needed for Pain. 13 tablet 6/12/2020 7/12/2020 Jaciel Diaz MD        Follow-up Information     Follow up With Specialties Details Why Contact Info    Suhail Alston MD Internal Medicine Schedule an appointment as soon as possible for a visit in 3 days For follow-up and re-evaluation [address] [facility] [address]  [phone]      [facility] Emergency Medicine  As needed, for any new or worsening symptoms [address] [facility] [id number] [phone]                                     Jaciel Diaz MD  06/12/20 9049

## 2020-06-12 NOTE — ED NOTES
PT GIVEN DC INSTRUCTIONS. PT INSTRUCTED TO FOLLOW UP WITH PCP. PT VERB UNDERSTANDING.PT HAS NO FURTHER QUESTIONS OR CONCERNS. PT ASSISTED TO CHECKOUT

## 2023-08-08 ENCOUNTER — HOSPITAL ENCOUNTER (EMERGENCY)
Facility: OTHER | Age: 58
Discharge: HOME | End: 2023-08-08
Attending: EMERGENCY MEDICINE
Payer: MEDICAID

## 2023-08-08 VITALS
BODY MASS INDEX: 27.79 KG/M2 | TEMPERATURE: 98 F | OXYGEN SATURATION: 100 % | HEIGHT: 62 IN | HEART RATE: 67 BPM | RESPIRATION RATE: 17 BRPM | DIASTOLIC BLOOD PRESSURE: 92 MMHG | WEIGHT: 151 LBS | SYSTOLIC BLOOD PRESSURE: 162 MMHG

## 2023-08-08 DIAGNOSIS — S20.222A BACK CONTUSION, LEFT, INITIAL ENCOUNTER: Primary | ICD-10-CM

## 2023-08-08 DIAGNOSIS — M54.9 UPPER BACK PAIN ON LEFT SIDE: ICD-10-CM

## 2023-08-08 DIAGNOSIS — M25.512 SHOULDER PAIN, LEFT: ICD-10-CM

## 2023-08-08 PROCEDURE — 63600175 PHARM REV CODE 636 W HCPCS: Performed by: EMERGENCY MEDICINE

## 2023-08-08 PROCEDURE — 96372 THER/PROPH/DIAG INJ SC/IM: CPT | Performed by: EMERGENCY MEDICINE

## 2023-08-08 PROCEDURE — 99284 EMERGENCY DEPT VISIT MOD MDM: CPT | Mod: 25

## 2023-08-08 RX ORDER — IBUPROFEN 600 MG/1
600 TABLET ORAL EVERY 8 HOURS PRN
Qty: 15 TABLET | Refills: 0 | Status: SHIPPED | OUTPATIENT
Start: 2023-08-08

## 2023-08-08 RX ORDER — KETOROLAC TROMETHAMINE 30 MG/ML
15 INJECTION, SOLUTION INTRAMUSCULAR; INTRAVENOUS
Status: COMPLETED | OUTPATIENT
Start: 2023-08-08 | End: 2023-08-08

## 2023-08-08 RX ADMIN — KETOROLAC TROMETHAMINE 15 MG: 30 INJECTION, SOLUTION INTRAMUSCULAR; INTRAVENOUS at 10:08

## 2023-08-09 NOTE — ED PROVIDER NOTES
Encounter Date: 2023    SCRIBE #1 NOTE: I, Aislinn Blackwell, am scribing for, and in the presence of,  Aram Trivedi MD. I have scribed the following portions of the note - Other sections scribed: HPI,ROS,PE.       History     Chief Complaint   Patient presents with    Neck Pain     Left sided Neck & left shoulder pain occurred after a cabinet fell onto her head at 4:30pm this evening.    (-)LOC  (-)blood thinners        Time seen by provider: 10:05 PM    This is a 58 y.o. female with PMHx of HTN who presents with complaint of left sided neck and shoulder pain from injury 5 hours ago. Patient was in the bathroom when the bathroom cabinet fell on her upper back and left upper arm.  She notes she felt pain immediately and reports the pain progressively worsened over time. Patient reports having pain in the left side of her neck, shoulder, and back. Patient has no associated symptoms.    This is the extent of the patient's complaints at this time.       The history is provided by the patient. No  was used.     Review of patient's allergies indicates:  No Known Allergies  Past Medical History:   Diagnosis Date    Hyperlipidemia     Hypertension     Myasthenia gravis      Past Surgical History:   Procedure Laterality Date     SECTION      vocal chord surgery       History reviewed. No pertinent family history.  Social History     Tobacco Use    Smoking status: Every Day     Current packs/day: 0.00    Smokeless tobacco: Never   Substance Use Topics    Alcohol use: Yes    Drug use: Yes     Types: Marijuana     Review of Systems   Constitutional:  Negative for fever.   HENT:  Negative for congestion.    Eyes:  Negative for redness.   Respiratory:  Negative for shortness of breath.    Cardiovascular:  Negative for chest pain.   Gastrointestinal:  Negative for abdominal pain.   Genitourinary:  Negative for dysuria.   Musculoskeletal:  Positive for back pain and neck pain.   Skin:   Negative for rash.   Neurological:  Negative for headaches.   Psychiatric/Behavioral:  Negative for confusion.        Physical Exam     Initial Vitals [08/08/23 2141]   BP Pulse Resp Temp SpO2   (!) 157/84 76 17 98 °F (36.7 °C) 99 %      MAP       --         Physical Exam    Constitutional: She appears well-developed and well-nourished. She is not diaphoretic. No distress.   HENT:   Head: Normocephalic and atraumatic.   Eyes: Conjunctivae are normal.   Neck: Neck supple.   Cardiovascular:  Normal rate, regular rhythm, S1 normal, S2 normal, normal heart sounds and intact distal pulses.           No murmur heard.  Pulmonary/Chest: Breath sounds normal. No respiratory distress. She has no wheezes. She has no rhonchi. She has no rales.   Abdominal: Abdomen is soft. There is no abdominal tenderness. There is no rebound and no guarding.   Musculoskeletal:         General: No edema.      Cervical back: Neck supple.      Comments: Mild left shoulder humerus and upper back tenderness, left cervical muscle tenderness. No ecchymosis or soft tissue edema      Neurological: She is alert and oriented to person, place, and time.   Skin: Skin is warm and dry.   Psychiatric: She has a normal mood and affect.         ED Course   Procedures  Labs Reviewed - No data to display       Imaging Results              X-Ray Shoulder Trauma Left (Final result)  Result time 08/08/23 22:40:25      Final result by Reginald Suggs MD (08/08/23 22:40:25)                   Impression:      No acute osseous abnormality identified.      Electronically signed by: Reginald Suggs MD  Date:    08/08/2023  Time:    22:40               Narrative:    EXAMINATION:  XR SHOULDER TRAUMA 3 VIEW LEFT; XR HUMERUS 2 VIEW LEFT    CLINICAL HISTORY:  Pain in left shoulder    TECHNIQUE:  Three views of the left shoulder were performed.  Left humerus AP and lateral.    COMPARISON  None    FINDINGS:  No evidence of acute displaced fracture, dislocation, or osseous  destructive process.  Suspected mild calcific tendinitis with small calcific density seen near the superolateral aspect of the humeral head.                                       X-Ray Humerus 2 View Left (Final result)  Result time 08/08/23 22:40:25      Final result by Reginald Suggs MD (08/08/23 22:40:25)                   Impression:      No acute osseous abnormality identified.      Electronically signed by: Reginald Suggs MD  Date:    08/08/2023  Time:    22:40               Narrative:    EXAMINATION:  XR SHOULDER TRAUMA 3 VIEW LEFT; XR HUMERUS 2 VIEW LEFT    CLINICAL HISTORY:  Pain in left shoulder    TECHNIQUE:  Three views of the left shoulder were performed.  Left humerus AP and lateral.    COMPARISON  None    FINDINGS:  No evidence of acute displaced fracture, dislocation, or osseous destructive process.  Suspected mild calcific tendinitis with small calcific density seen near the superolateral aspect of the humeral head.                                       X-Ray Chest PA And Lateral (Final result)  Result time 08/08/23 22:37:31      Final result by Reginald Suggs MD (08/08/23 22:37:31)                   Impression:      No acute cardiopulmonary process identified.      Electronically signed by: Reginald Suggs MD  Date:    08/08/2023  Time:    22:37               Narrative:    EXAMINATION:  XR CHEST PA AND LATERAL    CLINICAL HISTORY:  Dorsalgia, unspecified    TECHNIQUE:  PA and lateral views of the chest were performed.    COMPARISON:  February 2019.    FINDINGS:  Cardiac silhouette is normal in size.  Lungs are symmetrically expanded.  No evidence of focal consolidative process, pneumothorax, or significant pleural effusion.  No acute osseous abnormality identified.                                       X-Ray Cervical Spine AP And Lateral (Final result)  Result time 08/08/23 22:38:43      Final result by Reginald Suggs MD (08/08/23 22:38:43)                   Impression:      No acute  cervical spine abnormalities identified.      Electronically signed by: Reginald Suggs MD  Date:    08/08/2023  Time:    22:38               Narrative:    EXAMINATION:  XR CERVICAL SPINE AP LATERAL    CLINICAL HISTORY:  Trauma;    TECHNIQUE:  AP, lateral and open mouth views of the cervical spine were performed.    COMPARISON:  None.    FINDINGS:  No evidence of acute cervical spine fracture or subluxation.  Cervical spine alignment is within normal limits.  Odontoid process appears intact.  Intervertebral disc space narrowing and mild degenerative spurring are visualized from the C4-5 through C7-T1 levels.  Surrounding soft tissues show no significant abnormalities.                                       Medications   ketorolac injection 15 mg (15 mg Intramuscular Given 8/8/23 6762)     Medical Decision Making:   History:   Old Medical Records: I decided to obtain old medical records.  Initial Assessment:       58-year-old male with history of HTN presents for evaluation of left shoulder and upper back pain s/p injury this afternoon.  Patient states she was using the bathroom and a cabinet fell onto her left shoulder, upper back, and upper arm.  She felt mild pain initially that has somewhat worsened since then, has not taken any pain meds so far.  She was otherwise at normal baseline prior to injury, no other complaints.  On arrival patient with slightly elevated from pressure otherwise normal vitals, resting comfortably.  She has mild tenderness to left cervical paraspinal muscles, left shoulder, left upper back, and left humerus.  No midline spine tenderness of cervical or thoracic area.  However she has no significant soft tissue edema, overlying ecchymosis, and has full ROM.  More likely contusions but will rule out fractures with x-rays, and treat with Toradol, reassess.    X-rays all affected sites with no sign of fracture or bony injury per my independent interpretation.  Patient felt improved after Toradol.   Stable for discharge with further supportive care for upper back and left shoulder contusions, will Rx NSAIDs p.r.n. and rest/ice, patient understands return to the ED for any worsening pain or other concerns.    Clinical Tests:   Radiological Study: Ordered and Reviewed          Scribe Attestation:   Scribe #1: I performed the above scribed service and the documentation accurately describes the services I performed. I attest to the accuracy of the note.            \I, Dr. Aram Trivedi, personally performed the services described in this documentation. All medical record entries made by the scribe were at my direction and in my presence.  I have reviewed the chart and agree that the record reflects my personal performance and is accurate and complete. Aram Trivedi MD.              Clinical Impression:   Final diagnoses:  [M25.512] Shoulder pain, left  [M54.9] Upper back pain on left side  [S20.222A] Back contusion, left, initial encounter (Primary)        ED Disposition Condition    Discharge Stable          ED Prescriptions       Medication Sig Dispense Start Date End Date Auth. Provider    ibuprofen (ADVIL,MOTRIN) 600 MG tablet Take 1 tablet (600 mg total) by mouth every 8 (eight) hours as needed for Pain. 15 tablet 8/8/2023 -- Aram Trivedi MD          Follow-up Information       Follow up With Specialties Details Why Contact Info    [facility] Emergency Dept Emergency Medicine Go to  If symptoms worsen [id number]  [facility] [id number] [phone]             Aram Trivedi MD  08/09/23 0043

## 2023-08-09 NOTE — ED TRIAGE NOTES
Pt presents to ER c/o left sided neck & left shoulder pain which occurred after a cabinet fell onto her while using the toilet. Denies LOC, n/v, dizziness. (-) blood thinners